# Patient Record
Sex: FEMALE | Race: WHITE
[De-identification: names, ages, dates, MRNs, and addresses within clinical notes are randomized per-mention and may not be internally consistent; named-entity substitution may affect disease eponyms.]

---

## 2023-07-18 ENCOUNTER — HOSPITAL ENCOUNTER (OUTPATIENT)
Dept: HOSPITAL 75 - RAD | Age: 63
End: 2023-07-18
Attending: FAMILY MEDICINE
Payer: COMMERCIAL

## 2023-07-18 DIAGNOSIS — M48.062: ICD-10-CM

## 2023-07-18 DIAGNOSIS — M47.816: Primary | ICD-10-CM

## 2023-07-18 DIAGNOSIS — M47.817: ICD-10-CM

## 2023-07-18 PROCEDURE — 72148 MRI LUMBAR SPINE W/O DYE: CPT

## 2023-07-18 NOTE — DIAGNOSTIC IMAGING REPORT
PROCEDURE: MRI lumbar spine.



TECHNIQUE: Multiplanar, multisequence MRI of the lumbar spine was

performed without contrast.



INDICATION:  Back pain and right-sided sciatica with leg

numbness.



COMPARISON: None available.



FINDINGS: Dextroscoliosis has apex at L2-L3. There is 10 mm of

left lateral translation of L2 on L3. No anterolisthesis within

the lumbar spine. No fracture concerning marrow replacing

process. There are a few areas of mild Modic type I endplate

changes at L5-S1 and L2-L3. Distal thoracic cord is normal in

appearance. No abnormal enlargement or clumping of the distal

nerve roots.



L1-L2: Minimal disc bulge. No spinal canal or neuroforaminal

stenosis.



L2-L3: Disc bulge is asymmetric to the left causing moderate

neuroforaminal stenosis but no abutment of the exiting nerve

root. There is mild to moderate neuroforaminal narrowing on the

right as well. Facet osteoporosis present. Overall there is mild

spinal canal stenosis but no mass effect on the distal nerve

roots.



L3-L4: Disc bulge with facet osteoarthritis and ligament

hypertrophy. This causes mild spinal canal stenosis but no mass

effect on the nerve roots. Mild bilateral neuroforaminal stenosis

without mass effect.



L4-L5: Facet osteoporosis arthritis and disc bulge causes mild

spinal canal stenosis. Moderate to severe left neuroforaminal

stenosis causes abutment of the exiting left L4 nerve root. Mild

right neuroforaminal stenosis without mass effect. No intrathecal

nerve root impingement.



L5-S1: Disc bulge is asymmetric to the right causing severe

neuroforaminal stenosis and potential compression of the exiting

L5 nerve. Moderate left neuroforaminal stenosis. No spinal canal

stenosis.



IMPRESSION:

1. No fracture.

2. Multilevel degenerative changes are present, there is

potential for impingement of the nerve roots at L4-L5 and L5-S1.



Dictated by: 



  Dictated on workstation # [id number]